# Patient Record
Sex: FEMALE
[De-identification: names, ages, dates, MRNs, and addresses within clinical notes are randomized per-mention and may not be internally consistent; named-entity substitution may affect disease eponyms.]

---

## 2019-06-17 ENCOUNTER — APPOINTMENT (OUTPATIENT)
Dept: OTOLARYNGOLOGY | Facility: CLINIC | Age: 59
End: 2019-06-17
Payer: COMMERCIAL

## 2019-06-17 VITALS
DIASTOLIC BLOOD PRESSURE: 50 MMHG | HEIGHT: 62 IN | HEART RATE: 50 BPM | BODY MASS INDEX: 29.44 KG/M2 | SYSTOLIC BLOOD PRESSURE: 94 MMHG | WEIGHT: 160 LBS

## 2019-06-17 VITALS — DIASTOLIC BLOOD PRESSURE: 62 MMHG | HEART RATE: 59 BPM | SYSTOLIC BLOOD PRESSURE: 94 MMHG

## 2019-06-17 DIAGNOSIS — Z82.3 FAMILY HISTORY OF STROKE: ICD-10-CM

## 2019-06-17 DIAGNOSIS — Z87.891 PERSONAL HISTORY OF NICOTINE DEPENDENCE: ICD-10-CM

## 2019-06-17 DIAGNOSIS — Z80.9 FAMILY HISTORY OF MALIGNANT NEOPLASM, UNSPECIFIED: ICD-10-CM

## 2019-06-17 PROCEDURE — 99204 OFFICE O/P NEW MOD 45 MIN: CPT

## 2019-06-17 RX ORDER — URSODIOL 500 MG/1
TABLET ORAL
Refills: 0 | Status: ACTIVE | COMMUNITY

## 2019-06-17 RX ORDER — ESCITALOPRAM OXALATE 5 MG/1
TABLET, FILM COATED ORAL
Refills: 0 | Status: ACTIVE | COMMUNITY

## 2019-06-17 RX ORDER — CLONAZEPAM 2 MG/1
TABLET ORAL
Refills: 0 | Status: ACTIVE | COMMUNITY

## 2019-06-17 NOTE — PHYSICAL EXAM
[Binocular Microscopic Exam] : Binocular microscopic exam was performed [Hearing Loss Right Only] : normal [Hearing Loss Left Only] : normal [Nystagmus] : ~T no ~M nystagmus was seen [Romberg's Sign] : Romberg's sign was absent [Jose-Hallliseke] : Hartline-Hallpike: Negative [Midline] : trachea located in midline position [Normal] : no rashes

## 2019-06-17 NOTE — ASSESSMENT
[FreeTextEntry1] : Nonspecific dizziness. There appears to be elements of positional vertigo but this does not appear to be the primary factor. I have encouraged her to continue vestibular physical therapy and to add balance training.\par \par She is scheduled to see a neuro ophthalmologist which I have encouraged her to do so.\par \par Some elements of her symptoms may be emotional/mental health issues. I have Discussed this with her.\par \par

## 2019-06-17 NOTE — CONSULT LETTER
[Please see my note below.] : Please see my note below. [FreeTextEntry1] : Thank you for allowing me to participate in the care of your patient.\par Please see the attached visit note.\par \par \par \par Tres Valadez\par Otology\par Department of Otolaryngology\par [facility]\par  [FreeTextEntry2] : Dear Ruben Lopez\par [address]\[place] [address]

## 2019-06-17 NOTE — DATA REVIEWED
[de-identified] : Previous audiometry provided and reviewed from January 2019: Marginal high-frequency hearing loss noted bilaterally, symmetric periods WORD discrimination normal. [de-identified] : [facility] report dated January 2019,  normal [de-identified] : MRI of the internal auditory canals January 2019 no mass lesions.\par \par Carotid and Vertebral artery Doppler study normal

## 2019-06-17 NOTE — HISTORY OF PRESENT ILLNESS
[de-identified] : JAUN RODRIGUEZ has a history of dizziness since January 2019.  Sudden onset of dysequilibrium.  Heavy headed feeling with off balance made worse by changes in gaze.  No vertigo sensations.  MIld nausea reported.  Possible decrease in hearing. \par No tinnitus reported. No significant prior history of ear disease.\par \par Recently begun treatment for anxiety/depression on new medications. Currently reports being unable to work.\par \par Being treated by a vestibular physical therapist for presumed peripheral vertigo.